# Patient Record
Sex: FEMALE | Race: WHITE | Employment: OTHER | URBAN - METROPOLITAN AREA
[De-identification: names, ages, dates, MRNs, and addresses within clinical notes are randomized per-mention and may not be internally consistent; named-entity substitution may affect disease eponyms.]

---

## 2017-01-05 RX ORDER — SIMVASTATIN 40 MG
40 TABLET ORAL NIGHTLY
Qty: 90 TABLET | Refills: 3 | Status: SHIPPED | OUTPATIENT
Start: 2017-01-05 | End: 2017-12-16

## 2017-01-05 RX ORDER — LISINOPRIL 10 MG/1
10 TABLET ORAL
Qty: 90 TABLET | Refills: 3 | Status: SHIPPED | OUTPATIENT
Start: 2017-01-05 | End: 2017-12-16

## 2017-04-05 ENCOUNTER — OFFICE VISIT (OUTPATIENT)
Dept: INTERNAL MEDICINE CLINIC | Facility: CLINIC | Age: 67
End: 2017-04-05

## 2017-04-05 VITALS
RESPIRATION RATE: 16 BRPM | DIASTOLIC BLOOD PRESSURE: 64 MMHG | BODY MASS INDEX: 28.57 KG/M2 | SYSTOLIC BLOOD PRESSURE: 112 MMHG | WEIGHT: 149.38 LBS | TEMPERATURE: 98 F | HEIGHT: 60.5 IN | HEART RATE: 100 BPM

## 2017-04-05 DIAGNOSIS — F32.A ANXIETY AND DEPRESSION: ICD-10-CM

## 2017-04-05 DIAGNOSIS — F41.9 ANXIETY AND DEPRESSION: ICD-10-CM

## 2017-04-05 DIAGNOSIS — K21.9 GASTROESOPHAGEAL REFLUX DISEASE, ESOPHAGITIS PRESENCE NOT SPECIFIED: ICD-10-CM

## 2017-04-05 DIAGNOSIS — R94.31 ABNORMAL EKG: ICD-10-CM

## 2017-04-05 DIAGNOSIS — I10 ESSENTIAL HYPERTENSION: ICD-10-CM

## 2017-04-05 DIAGNOSIS — E11.9 TYPE 2 DIABETES MELLITUS WITHOUT COMPLICATION, WITHOUT LONG-TERM CURRENT USE OF INSULIN (HCC): ICD-10-CM

## 2017-04-05 DIAGNOSIS — Z86.19 HISTORY OF HEPATITIS B: ICD-10-CM

## 2017-04-05 DIAGNOSIS — Z00.00 WELLNESS EXAMINATION: Primary | ICD-10-CM

## 2017-04-05 DIAGNOSIS — N32.81 OAB (OVERACTIVE BLADDER): ICD-10-CM

## 2017-04-05 DIAGNOSIS — K58.9 IRRITABLE BOWEL SYNDROME WITHOUT DIARRHEA: ICD-10-CM

## 2017-04-05 DIAGNOSIS — E78.00 HYPERCHOLESTEREMIA: ICD-10-CM

## 2017-04-05 DIAGNOSIS — Z12.31 ENCOUNTER FOR SCREENING MAMMOGRAM FOR MALIGNANT NEOPLASM OF BREAST: ICD-10-CM

## 2017-04-05 PROCEDURE — G0439 PPPS, SUBSEQ VISIT: HCPCS | Performed by: INTERNAL MEDICINE

## 2017-04-05 PROCEDURE — 93000 ELECTROCARDIOGRAM COMPLETE: CPT | Performed by: INTERNAL MEDICINE

## 2017-04-05 NOTE — PROGRESS NOTES
Hallie Arce is a 78year old female who presents for a Medicare Annual Wellness visit. Patient with dm2, htn, OA right knee, gerd, fatty liver, remote h/o hep B- sees Dr. Ridge Mathews, OAB, irritable bowel,  mood d/o seeing psychiatrist here for wellness. BY MOUTH TWICE DAILY WITH MEALS Disp: 180 tablet Rfl: 0   LISINOPRIL 10 MG Oral Tab TAKE 1 TABLET BY MOUTH EVERY DAY Disp: 30 tablet Rfl: 0   Alendronate Sodium (FOSAMAX) 35 MG Oral Tab TAKE 1 TABLET BY MOUTH EVERY 7 DAYS.  TAKE WITH A FULL GLASS OF WATER, Balanced         How would you describe your daily physical activity?: Moderate    How would you describe your current health state?: Fair    How do you maintain positive mental well-being?: Social Interaction    If you are a male age 38-65 or a female age Assessment section in Charting, test patient and document. Then, refresh your progress note to see your input here.   Cognitive Assessment     What day of the week is this?: Correct    What month is it?: Correct    What year is it?: Correct    Recall \"B No flowsheet data found. Screening Mammogram      Mammogram  Annually Mammogram,1 Yr due on 04/02/2017 Update Health Maintenance if applicable   Immunizations      Influenza No orders found for this or any previous visit.  Update Immunization Activity if Rash  Sulfa Drugs Cross R*    Hives    CURRENT MEDICATIONS:     Current Outpatient Prescriptions:  RISPERIDONE 2 MG Oral Tab TAKE 1 TABLET BY MOUTH EVERY NIGHT Disp: 90 tablet Rfl: 0   TEMAZEPAM 30 MG Oral Cap GIVE \"ANIYAH\" 1 CAPSULE BY MOUTH EVERY NIGHT levels of transaminase or lactic acid dehydrogenase (LDH)    • H/O bone density study 03/31/2012   • Hepatitis B    • IBS (irritable bowel syndrome)    • Other and unspecified hyperlipidemia    • Type II or unspecified type diabetes mellitus without mentio anemia  ENDOCRINE: denies thyroid history  ALL/ASTHMA: denies hx of allergy or asthma    EXAM:   /64 mmHg  Pulse 100  Temp(Src) 98.3 °F (36.8 °C) (Oral)  Resp 16  Ht 60.5\"  Wt 149 lb 6.4 oz  BMI 28.69 kg/m2     > BP Readings from Last 3 Encounters: TEST LEXISCAN; Future    Anxiety and depression - f/u psychiatrist    Irritable bowel syndrome without diarrhea- f/u Dr. James Vidales  -     Comp Metabolic Panel (14) [E]    Essential hypertension- controlled.  CPM  -     Comp Metabolic Panel (14) [E]  -     CARD

## 2017-04-29 ENCOUNTER — HOSPITAL ENCOUNTER (OUTPATIENT)
Dept: MAMMOGRAPHY | Facility: HOSPITAL | Age: 67
Discharge: HOME | End: 2017-04-29
Attending: INTERNAL MEDICINE
Payer: MEDICARE

## 2017-04-29 ENCOUNTER — HOSPITAL ENCOUNTER (OUTPATIENT)
Dept: CV DIAGNOSTICS | Facility: HOSPITAL | Age: 67
Discharge: HOME | End: 2017-04-29
Attending: INTERNAL MEDICINE
Payer: MEDICARE

## 2017-04-29 DIAGNOSIS — R94.31 ABNORMAL EKG: ICD-10-CM

## 2017-04-29 DIAGNOSIS — E11.9 TYPE 2 DIABETES MELLITUS WITHOUT COMPLICATION, WITHOUT LONG-TERM CURRENT USE OF INSULIN (HCC): ICD-10-CM

## 2017-04-29 DIAGNOSIS — I10 ESSENTIAL HYPERTENSION: ICD-10-CM

## 2017-04-29 DIAGNOSIS — Z12.31 ENCOUNTER FOR SCREENING MAMMOGRAM FOR MALIGNANT NEOPLASM OF BREAST: ICD-10-CM

## 2017-04-29 PROCEDURE — 93018 CV STRESS TEST I&R ONLY: CPT | Performed by: INTERNAL MEDICINE

## 2017-04-29 PROCEDURE — 77067 SCR MAMMO BI INCL CAD: CPT

## 2017-04-29 PROCEDURE — 78452 HT MUSCLE IMAGE SPECT MULT: CPT

## 2017-04-29 PROCEDURE — 78452 HT MUSCLE IMAGE SPECT MULT: CPT | Performed by: INTERNAL MEDICINE

## 2017-04-29 PROCEDURE — 93017 CV STRESS TEST TRACING ONLY: CPT

## 2017-04-29 RX ORDER — AMINOPHYLLINE DIHYDRATE 25 MG/ML
INJECTION, SOLUTION INTRAVENOUS
Status: COMPLETED
Start: 2017-04-29 | End: 2017-04-29

## 2017-04-29 RX ADMIN — AMINOPHYLLINE DIHYDRATE 125 MG: 25 INJECTION, SOLUTION INTRAVENOUS at 13:00:00

## 2017-06-27 NOTE — ADDENDUM NOTE
Encounter addended by: Steve Valenzuela RN on: 6/27/2017  1:32 PM<BR>    Actions taken: Letter status changed

## 2017-12-18 RX ORDER — SIMVASTATIN 40 MG
TABLET ORAL
Qty: 90 TABLET | Refills: 0 | Status: SHIPPED | OUTPATIENT
Start: 2017-12-18 | End: 2018-03-18

## 2017-12-18 RX ORDER — LISINOPRIL 10 MG/1
TABLET ORAL
Qty: 90 TABLET | Refills: 0 | Status: SHIPPED | OUTPATIENT
Start: 2017-12-18 | End: 2018-03-18

## 2017-12-18 NOTE — TELEPHONE ENCOUNTER
LOV: 4/5/2017   Upcoming Appt: None   Last Labs:6/26/2017 micro/creat, cmp, lipid  Last Refill: 3/15/2017       Per protocol route to provider.

## 2018-03-19 RX ORDER — SIMVASTATIN 40 MG
TABLET ORAL
Qty: 90 TABLET | Refills: 0 | Status: SHIPPED | OUTPATIENT
Start: 2018-03-19

## 2018-03-19 RX ORDER — LISINOPRIL 10 MG/1
TABLET ORAL
Qty: 90 TABLET | Refills: 0 | Status: SHIPPED | OUTPATIENT
Start: 2018-03-19

## 2018-04-09 ENCOUNTER — TELEPHONE (OUTPATIENT)
Dept: INTERNAL MEDICINE CLINIC | Facility: CLINIC | Age: 68
End: 2018-04-09

## 2018-05-08 ENCOUNTER — TELEPHONE (OUTPATIENT)
Dept: INTERNAL MEDICINE CLINIC | Facility: CLINIC | Age: 68
End: 2018-05-08

## 2018-05-08 NOTE — TELEPHONE ENCOUNTER
Pt has not been seen in over a year. She needs AWV and DM f/u. If she is still out pt and OV scheduled then message back to person she is seeing for OV to order fasting labs prior to visit. If we are no longer PCP then message back to me and TB.

## 2022-12-15 NOTE — MR AVS SNAPSHOT
[facility] [address] [address] [phone]               Thank you for choosing us for your health care visit with Luisa Welch MD.  We are glad to serve you and happy to provide you with this summar Your physician has ordered a radiology test that may require authorization from your insurance company.   Your physician or the clinic staff will work with your insurance company to obtain this authorization for your ordered radiology test.    To schedule a Commonly known as:  PRINIVIL,ZESTRIL           * lisinopril 10 MG Tabs   Take 1 tablet (10 mg total) by mouth once daily.    Commonly known as:  PRINIVIL,ZESTRIL           MetFORMIN HCl 500 MG Tabs   TAKE 1 TABLET BY MOUTH TWICE DAILY WITH MEALS   Commonly No Eat plenty of protein, keep the fat content low Sugars:  sodas and sports drinks, candies and desserts   Eat plenty of low-fat dairy products High fat meats and dairy   Choose whole grain products Foods high in sodium   Water is best for hydration Fast raquel

## 2025-02-20 ENCOUNTER — OFFICE VISIT (OUTPATIENT)
Dept: URBAN - METROPOLITAN AREA CLINIC 18 | Facility: CLINIC | Age: 75
End: 2025-02-20
Payer: COMMERCIAL

## 2025-02-20 DIAGNOSIS — Z96.1 PRESENCE OF INTRAOCULAR LENS: ICD-10-CM

## 2025-02-20 DIAGNOSIS — H26.493 OTHER SECONDARY CATARACT, BILATERAL: ICD-10-CM

## 2025-02-20 DIAGNOSIS — H04.123 DRY EYE SYNDROME OF BILATERAL LACRIMAL GLANDS: ICD-10-CM

## 2025-02-20 DIAGNOSIS — E11.9 TYPE 2 DIABETES MELLITUS WITHOUT COMPLICATIONS: Primary | ICD-10-CM

## 2025-02-20 PROCEDURE — 99203 OFFICE O/P NEW LOW 30 MIN: CPT | Performed by: OPTOMETRIST

## 2025-02-20 ASSESSMENT — INTRAOCULAR PRESSURE
OS: 16
OD: 16

## (undated) NOTE — Clinical Note
05/05/2017        Omer Mcmillan  [address] [facility]      Dear Tyree Parham records indicate that you have outstanding lab work and or testing that was ordered for you and has not yet been completed:      Lipid Panel [E]  Comp Metabolic P

## (undated) NOTE — Clinical Note
06/07/2017        Ivania Lee  [address] [facility] [id number]      Dear Drew Strange records indicate that you have outstanding lab work and or testing that was ordered for you and has not yet been completed:      Lipid Panel [E]  Comp Metabolic P